# Patient Record
Sex: FEMALE | Race: WHITE | Employment: FULL TIME | URBAN - METROPOLITAN AREA
[De-identification: names, ages, dates, MRNs, and addresses within clinical notes are randomized per-mention and may not be internally consistent; named-entity substitution may affect disease eponyms.]

---

## 2023-03-27 ENCOUNTER — HOSPITAL ENCOUNTER (EMERGENCY)
Age: 38
Discharge: HOME | End: 2023-03-27
Attending: EMERGENCY MEDICINE
Payer: COMMERCIAL

## 2023-03-27 ENCOUNTER — APPOINTMENT (OUTPATIENT)
Dept: GENERAL RADIOLOGY | Age: 38
End: 2023-03-27
Payer: COMMERCIAL

## 2023-03-27 VITALS
TEMPERATURE: 97.2 F | SYSTOLIC BLOOD PRESSURE: 112 MMHG | HEIGHT: 65 IN | DIASTOLIC BLOOD PRESSURE: 87 MMHG | RESPIRATION RATE: 16 BRPM | HEART RATE: 72 BPM | BODY MASS INDEX: 29.16 KG/M2 | OXYGEN SATURATION: 100 % | WEIGHT: 175 LBS

## 2023-03-27 DIAGNOSIS — S61.215A LACERATION OF LEFT RING FINGER WITHOUT FOREIGN BODY, NAIL DAMAGE STATUS UNSPECIFIED, INITIAL ENCOUNTER: Primary | ICD-10-CM
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
## 2023-03-27 PROCEDURE — 6370000000 HC RX 637 (ALT 250 FOR IP): Performed by: NURSE PRACTITIONER

## 2023-03-27 PROCEDURE — 99284 EMERGENCY DEPT VISIT MOD MDM: CPT

## 2023-03-27 PROCEDURE — 10120 INC&RMVL FB SUBQ TISS SMPL: CPT

## 2023-03-27 PROCEDURE — 90714 TD VACC NO PRESV 7 YRS+ IM: CPT | Performed by: NURSE PRACTITIONER

## 2023-03-27 PROCEDURE — 90471 IMMUNIZATION ADMIN: CPT | Performed by: NURSE PRACTITIONER

## 2023-03-27 PROCEDURE — 73130 X-RAY EXAM OF HAND: CPT

## 2023-03-27 PROCEDURE — 6360000002 HC RX W HCPCS: Performed by: NURSE PRACTITIONER

## 2023-03-27 RX ORDER — TETANUS AND DIPHTHERIA TOXOIDS ADSORBED 2; 2 [LF]/.5ML; [LF]/.5ML
0.5 INJECTION INTRAMUSCULAR ONCE
Status: COMPLETED | OUTPATIENT
Start: 2023-03-27 | End: 2023-03-27

## 2023-03-27 RX ORDER — GINSENG 100 MG
CAPSULE ORAL ONCE
Status: COMPLETED | OUTPATIENT
Start: 2023-03-27 | End: 2023-03-27

## 2023-03-27 RX ORDER — IBUPROFEN 800 MG/1
800 TABLET ORAL EVERY 6 HOURS PRN
Qty: 21 TABLET | Refills: 0 | Status: SHIPPED | OUTPATIENT
Start: 2023-03-27

## 2023-03-27 RX ADMIN — BACITRACIN: 500 OINTMENT TOPICAL at 09:38

## 2023-03-27 RX ADMIN — TETANUS AND DIPHTHERIA TOXOIDS ADSORBED 0.5 ML: 2; 2 INJECTION INTRAMUSCULAR at 09:38

## 2023-03-27 ASSESSMENT — PAIN - FUNCTIONAL ASSESSMENT: PAIN_FUNCTIONAL_ASSESSMENT: NONE - DENIES PAIN

## 2023-03-27 NOTE — ED PROVIDER NOTES
Questions are answered at this time and they are agreeable with the plan.      --------------------------------- IMPRESSION AND DISPOSITION ---------------------------------    IMPRESSION  1. Laceration of left ring finger without foreign body, nail damage status unspecified, initial encounter    2.  Ring or other jewelry causing external constriction, sequela        DISPOSITION  Disposition: Discharge to home  Patient condition is good                  [facility], APRN - CNP  03/27/23 [id number]

## 2023-03-27 NOTE — Clinical Note
Marni Rodriguez was seen and treated in our emergency department on 3/27/2023. She may return to work on 03/28/2023. If you have any questions or concerns, please don't hesitate to call.       Yamilet Panchal, APRN - CNP no drainage/no fever/no purulent drainage/no red streaks/no vomiting/no chills

## 2023-09-21 PROBLEM — O99.212 OBESITY AFFECTING PREGNANCY IN SECOND TRIMESTER: Status: ACTIVE | Noted: 2023-09-21

## 2024-01-03 PROBLEM — Q32.4: Status: ACTIVE | Noted: 2024-01-03

## 2024-01-20 ENCOUNTER — HOSPITAL ENCOUNTER (INPATIENT)
Age: 39
LOS: 4 days | Discharge: HOME | End: 2024-01-24
Attending: OBSTETRICS & GYNECOLOGY | Admitting: OBSTETRICS & GYNECOLOGY
Payer: COMMERCIAL

## 2024-01-20 ENCOUNTER — ANESTHESIA EVENT (OUTPATIENT)
Dept: LABOR AND DELIVERY | Age: 39
End: 2024-01-20
Payer: COMMERCIAL

## 2024-01-20 ENCOUNTER — ANESTHESIA (OUTPATIENT)
Dept: LABOR AND DELIVERY | Age: 39
End: 2024-01-20
Payer: COMMERCIAL

## 2024-01-20 DIAGNOSIS — G89.18 ACUTE POSTOPERATIVE PAIN: Primary | ICD-10-CM

## 2024-01-20 PROBLEM — Z3A.37 37 WEEKS GESTATION OF PREGNANCY: Status: ACTIVE | Noted: 2024-01-20

## 2024-01-20 LAB
ABO + RH BLD: NORMAL
AMNISURE, POC: POSITIVE
AMPHET UR QL SCN: NEGATIVE
ARM BAND NUMBER: NORMAL
BARBITURATES UR QL SCN: NEGATIVE
BENZODIAZ UR QL: NEGATIVE
BLOOD BANK SAMPLE EXPIRATION: NORMAL
BLOOD GROUP ANTIBODIES SERPL: NEGATIVE
BUPRENORPHINE UR QL: NEGATIVE
CANNABINOIDS UR QL SCN: NEGATIVE
COCAINE UR QL SCN: NEGATIVE
ERYTHROCYTE [DISTWIDTH] IN BLOOD BY AUTOMATED COUNT: 14.3 % (ref 11.5–15)
FENTANYL UR QL: NEGATIVE
HCT VFR BLD AUTO: 35.1 % (ref 34–48)
HGB BLD-MCNC: 11.2 G/DL (ref 11.5–15.5)
Lab: NORMAL
MCH RBC QN AUTO: 27.7 PG (ref 26–35)
MCHC RBC AUTO-ENTMCNC: 31.9 G/DL (ref 32–34.5)
MCV RBC AUTO: 86.9 FL (ref 80–99.9)
METHADONE UR QL: NEGATIVE
NEGATIVE QC PASS/FAIL: NORMAL
OPIATES UR QL SCN: NEGATIVE
OXYCODONE UR QL SCN: NEGATIVE
PCP UR QL SCN: NEGATIVE
PLATELET # BLD AUTO: 250 K/UL (ref 130–450)
PMV BLD AUTO: 10 FL (ref 7–12)
POSITIVE QC PASS/FAIL: NORMAL
RBC # BLD AUTO: 4.04 M/UL (ref 3.5–5.5)
TEST INFORMATION: NORMAL
WBC OTHER # BLD: 11.2 K/UL (ref 4.5–11.5)

## 2024-01-20 PROCEDURE — 2500000003 HC RX 250 WO HCPCS: Performed by: ANESTHESIOLOGY

## 2024-01-20 PROCEDURE — 51701 INSERT BLADDER CATHETER: CPT

## 2024-01-20 PROCEDURE — 36415 COLL VENOUS BLD VENIPUNCTURE: CPT

## 2024-01-20 PROCEDURE — 86900 BLOOD TYPING SEROLOGIC ABO: CPT

## 2024-01-20 PROCEDURE — 2580000003 HC RX 258: Performed by: OBSTETRICS & GYNECOLOGY

## 2024-01-20 PROCEDURE — 3700000025 EPIDURAL BLOCK: Performed by: ANESTHESIOLOGY

## 2024-01-20 PROCEDURE — 1220000001 HC SEMI PRIVATE L&D R&B

## 2024-01-20 PROCEDURE — 86901 BLOOD TYPING SEROLOGIC RH(D): CPT

## 2024-01-20 PROCEDURE — 80307 DRUG TEST PRSMV CHEM ANLYZR: CPT

## 2024-01-20 PROCEDURE — 85027 COMPLETE CBC AUTOMATED: CPT

## 2024-01-20 PROCEDURE — 86850 RBC ANTIBODY SCREEN: CPT

## 2024-01-20 PROCEDURE — 6360000002 HC RX W HCPCS

## 2024-01-20 RX ORDER — ACETAMINOPHEN 650 MG
TABLET, EXTENDED RELEASE ORAL
Status: DISCONTINUED
Start: 2024-01-20 | End: 2024-01-21

## 2024-01-20 RX ORDER — SODIUM CHLORIDE, SODIUM LACTATE, POTASSIUM CHLORIDE, CALCIUM CHLORIDE 600; 310; 30; 20 MG/100ML; MG/100ML; MG/100ML; MG/100ML
INJECTION, SOLUTION INTRAVENOUS CONTINUOUS
Status: DISCONTINUED | OUTPATIENT
Start: 2024-01-20 | End: 2024-01-21

## 2024-01-20 RX ORDER — ONDANSETRON 2 MG/ML
4 INJECTION INTRAMUSCULAR; INTRAVENOUS EVERY 6 HOURS PRN
Status: DISCONTINUED | OUTPATIENT
Start: 2024-01-20 | End: 2024-01-21

## 2024-01-20 RX ORDER — BUPIVACAINE HYDROCHLORIDE 2.5 MG/ML
INJECTION, SOLUTION EPIDURAL; INFILTRATION; INTRACAUDAL PRN
Status: DISCONTINUED | OUTPATIENT
Start: 2024-01-20 | End: 2024-01-21 | Stop reason: SDUPTHER

## 2024-01-20 RX ORDER — ERYTHROMYCIN 5 MG/G
OINTMENT OPHTHALMIC
Status: DISCONTINUED
Start: 2024-01-20 | End: 2024-01-21

## 2024-01-20 RX ORDER — FENTANYL CITRATE 50 UG/ML
INJECTION, SOLUTION INTRAMUSCULAR; INTRAVENOUS PRN
Status: DISCONTINUED | OUTPATIENT
Start: 2024-01-20 | End: 2024-01-21 | Stop reason: SDUPTHER

## 2024-01-20 RX ORDER — SODIUM CHLORIDE 9 MG/ML
25 INJECTION, SOLUTION INTRAVENOUS PRN
Status: DISCONTINUED | OUTPATIENT
Start: 2024-01-20 | End: 2024-01-21

## 2024-01-20 RX ORDER — SODIUM CHLORIDE, SODIUM LACTATE, POTASSIUM CHLORIDE, AND CALCIUM CHLORIDE .6; .31; .03; .02 G/100ML; G/100ML; G/100ML; G/100ML
500 INJECTION, SOLUTION INTRAVENOUS PRN
Status: DISCONTINUED | OUTPATIENT
Start: 2024-01-20 | End: 2024-01-21

## 2024-01-20 RX ORDER — MISOPROSTOL 200 UG/1
800 TABLET ORAL PRN
Status: DISCONTINUED | OUTPATIENT
Start: 2024-01-20 | End: 2024-01-21

## 2024-01-20 RX ORDER — DOCUSATE SODIUM 100 MG/1
100 CAPSULE, LIQUID FILLED ORAL 2 TIMES DAILY
Status: DISCONTINUED | OUTPATIENT
Start: 2024-01-20 | End: 2024-01-21

## 2024-01-20 RX ORDER — CARBOPROST TROMETHAMINE 250 UG/ML
250 INJECTION, SOLUTION INTRAMUSCULAR PRN
Status: DISCONTINUED | OUTPATIENT
Start: 2024-01-20 | End: 2024-01-21

## 2024-01-20 RX ORDER — SODIUM CHLORIDE 0.9 % (FLUSH) 0.9 %
5-40 SYRINGE (ML) INJECTION EVERY 12 HOURS SCHEDULED
Status: DISCONTINUED | OUTPATIENT
Start: 2024-01-20 | End: 2024-01-21

## 2024-01-20 RX ORDER — LIDOCAINE HYDROCHLORIDE 10 MG/ML
INJECTION, SOLUTION INFILTRATION; PERINEURAL
Status: DISCONTINUED
Start: 2024-01-20 | End: 2024-01-21

## 2024-01-20 RX ORDER — NALOXONE HYDROCHLORIDE 0.4 MG/ML
INJECTION, SOLUTION INTRAMUSCULAR; INTRAVENOUS; SUBCUTANEOUS PRN
Status: DISCONTINUED | OUTPATIENT
Start: 2024-01-20 | End: 2024-01-21 | Stop reason: HOSPADM

## 2024-01-20 RX ORDER — METHYLERGONOVINE MALEATE 0.2 MG/ML
200 INJECTION INTRAVENOUS PRN
Status: DISCONTINUED | OUTPATIENT
Start: 2024-01-20 | End: 2024-01-21

## 2024-01-20 RX ORDER — PHYTONADIONE 1 MG/.5ML
INJECTION, EMULSION INTRAMUSCULAR; INTRAVENOUS; SUBCUTANEOUS
Status: DISCONTINUED
Start: 2024-01-20 | End: 2024-01-21

## 2024-01-20 RX ORDER — SODIUM CHLORIDE, SODIUM LACTATE, POTASSIUM CHLORIDE, AND CALCIUM CHLORIDE .6; .31; .03; .02 G/100ML; G/100ML; G/100ML; G/100ML
1000 INJECTION, SOLUTION INTRAVENOUS PRN
Status: DISCONTINUED | OUTPATIENT
Start: 2024-01-20 | End: 2024-01-21

## 2024-01-20 RX ORDER — SODIUM CHLORIDE 0.9 % (FLUSH) 0.9 %
5-40 SYRINGE (ML) INJECTION PRN
Status: DISCONTINUED | OUTPATIENT
Start: 2024-01-20 | End: 2024-01-21

## 2024-01-20 RX ADMIN — Medication 1 MILLI-UNITS/MIN: at 12:31

## 2024-01-20 RX ADMIN — SODIUM CHLORIDE, POTASSIUM CHLORIDE, SODIUM LACTATE AND CALCIUM CHLORIDE: 600; 310; 30; 20 INJECTION, SOLUTION INTRAVENOUS at 17:59

## 2024-01-20 RX ADMIN — BUPIVACAINE HYDROCHLORIDE 8 ML: 2.5 INJECTION, SOLUTION EPIDURAL; INFILTRATION; INTRACAUDAL; PERINEURAL at 21:58

## 2024-01-20 RX ADMIN — Medication 15 ML/HR: at 20:25

## 2024-01-20 RX ADMIN — Medication 15 ML/HR: at 14:15

## 2024-01-20 RX ADMIN — FENTANYL CITRATE 100 MCG: 50 INJECTION, SOLUTION INTRAMUSCULAR; INTRAVENOUS at 21:58

## 2024-01-20 ASSESSMENT — PAIN SCALES - GENERAL
PAINLEVEL_OUTOF10: 0
PAINLEVEL_OUTOF10: 0

## 2024-01-20 NOTE — PROGRESS NOTES
Patient presents to L&D with complaints of LOF since 300 today. Feels some mild contractions and fetal movement. Denies vaginal bleeding. IUP at 37.1, . Amnisure positive. GBS negative. Janelle Callaway CNM, in to see patient.

## 2024-01-20 NOTE — ANESTHESIA PROCEDURE NOTES
Epidural Block    Patient location during procedure: OB  Start time: 1/20/2024 2:07 PM  End time: 1/20/2024 2:25 PM  Reason for block: labor epidural  Staffing  Performed: other anesthesia staff   Other anesthesia staff: Elen Cedeño RN  Performed by: Marcos Eduardo APRN - CRNA  Authorized by: Anayeli Lemus DO    Epidural  Patient position: sitting  Prep: Betadine  Patient monitoring: cardiac monitor, continuous pulse ox and frequent blood pressure checks  Approach: midline  Location: L3-4  Injection technique: KHAI saline  Provider prep: mask and sterile gloves  Needle  Needle type: Tuohy   Needle gauge: 18 G  Needle length: 3.5 in  Needle insertion depth: 7 cm  Catheter type: end hole  Catheter size: 20 G.  Catheter at skin depth: 13 cm  Test dose: negativeCatheter Secured: tegaderm and tape  Assessment  Hemodynamics: stable  Attempts: 1  Outcomes: uncomplicated and patient tolerated procedure well  Additional Notes  Pt states epidural not working at all, epidural replaced a level lower without difficulty.  Old epidural catheter tip intact  Preanesthetic Checklist  Completed: patient identified, IV checked, site marked, risks and benefits discussed, surgical/procedural consents, equipment checked, pre-op evaluation, timeout performed, anesthesia consent given, oxygen available and monitors applied/VS acknowledged

## 2024-01-20 NOTE — PROGRESS NOTES
Assumed care of pt. Pt anxious. Admitted to room 7. Pt denies pain. Notes she is leaking clear fluid.

## 2024-01-20 NOTE — PROGRESS NOTES
Talked with rosemary hernández, pt 2 -80% with 2nd epidural, and is comfortable . Pt uc's q 1-2 pit from 10 back to 8

## 2024-01-20 NOTE — H&P
Department of Obstetrics and Gynecology  Labor and Delivery  History & Physical    Patient:  Shawna Lynn     Admit Date:  2024 10:35 AM  Medical Record Number:  [id number]    CHIEF COMPLAINT:  Leaking of fluid     PROBLEM LIST:     Patient Active Problem List   Diagnosis    Abnormal radiographic examination    Asthma exacerbation, mild    Congenital emphysema    Atypical mole    Obesity affecting pregnancy in second trimester    Bronchial atresia           HISTORY OF PRESENT ILLNESS:    The patient is a 38 y.o.  female  at 37w1d.   Patient presents with a chief complaint of leaking of fluid. This started in the middle of the night. Pt continued leaking clear fluid throughout the monrning. She has + fetal movement. Denies contractions or VB.     ESTIMATED DUE DATE: Estimated Date of Delivery: 24    PRENATAL CARE:  Complicated by: AMA    GBS: negative    Past OB History  OB History          1    Para   0    Term   0       0    AB   0    Living   0         SAB   0    IAB   0    Ectopic   0    Molar        Multiple   0    Live Births                    Past Medical History:        Diagnosis Date    Abnormal PFTs (pulmonary function tests)     Allergic rhinitis     Anxiety     Bronchial atresia     Bronchial atresia with segmental pulmonary emphysema     congenital emphysema    Congenital emphysema        Past Surgical History:        Procedure Laterality Date    WISDOM TOOTH EXTRACTION         Allergies:  Patient has no known allergies.    Social History:    Social History     Socioeconomic History    Marital status:      Spouse name: Not on file    Number of children: Not on file    Years of education: Not on file    Highest education level: Not on file   Occupational History    Not on file   Tobacco Use    Smoking status: Never    Smokeless tobacco: Never   Substance and Sexual Activity    Alcohol use: Yes     Alcohol/week: 0.0 standard drinks of alcohol     Comment:

## 2024-01-20 NOTE — ANESTHESIA PRE PROCEDURE
Department of Anesthesiology  Preprocedure Note       Name:  Shawna Lynn   Age:  38 y.o.  :  1985                                          MRN:  [id number]         Date:  2024      Surgeon: Dr. Goel    Procedure: labor analgesia    Medications prior to admission:   Prior to Admission medications    Medication Sig Start Date End Date Taking? Authorizing Provider   hydrocortisone (ANUSOL-HC) 2.5 % CREA rectal cream Apply rectally daily for 1-2 weeks 23   Margie Goel MD   Prenatal MV-Min-Fe Fum-FA-DHA (PRENATAL 1 PO) Take by mouth    Provider, MD Marcelino   escitalopram (LEXAPRO) 10 MG tablet TAKE ONE TABLET BY MOUTH DAILY 20   Rowdy West MD       Current medications:    Current Facility-Administered Medications   Medication Dose Route Frequency Provider Last Rate Last Admin    lactated ringers IV soln infusion   IntraVENous Continuous Margie Goel MD        lactated ringers bolus bolus 500 mL  500 mL IntraVENous PRN Margie Goel MD        Or    lactated ringers bolus bolus 1,000 mL  1,000 mL IntraVENous PRN Margie Goel MD        sodium chloride flush 0.9 % injection 5-40 mL  5-40 mL IntraVENous 2 times per day Margie Goel MD        sodium chloride flush 0.9 % injection 5-40 mL  5-40 mL IntraVENous PRN Margie Goel MD        0.9 % sodium chloride infusion  25 mL IntraVENous PRN Margie Goel MD        methylergonovine (METHERGINE) injection 200 mcg  200 mcg IntraMUSCular PRN Margie Goel MD        carboprost (HEMABATE) injection 250 mcg  250 mcg IntraMUSCular PRN Margie Goel MD        miSOPROStol (CYTOTEC) tablet 800 mcg  800 mcg Rectal PRN Margie Goel MD        tranexamic acid (CYKLOKAPRON) 1,000 mg in sodium chloride 0.9 % 100 mL IVPB  1,000 mg IntraVENous Once PRN Margie Goel MD        oxytocin (PITOCIN) 15 units in 250 mL infusion  87.3 leon-units/min IntraVENous Continuous PRN Margie Goel MD        And

## 2024-01-21 PROCEDURE — 6370000000 HC RX 637 (ALT 250 FOR IP): Performed by: ANESTHESIOLOGY

## 2024-01-21 PROCEDURE — 6360000002 HC RX W HCPCS: Performed by: ANESTHESIOLOGY

## 2024-01-21 PROCEDURE — 6360000002 HC RX W HCPCS

## 2024-01-21 PROCEDURE — 3700000000 HC ANESTHESIA ATTENDED CARE: Performed by: OBSTETRICS & GYNECOLOGY

## 2024-01-21 PROCEDURE — 6360000002 HC RX W HCPCS: Performed by: NURSE ANESTHETIST, CERTIFIED REGISTERED

## 2024-01-21 PROCEDURE — 2580000003 HC RX 258: Performed by: OBSTETRICS & GYNECOLOGY

## 2024-01-21 PROCEDURE — 51701 INSERT BLADDER CATHETER: CPT

## 2024-01-21 PROCEDURE — 2580000003 HC RX 258

## 2024-01-21 PROCEDURE — 1220000000 HC SEMI PRIVATE OB R&B

## 2024-01-21 PROCEDURE — 6360000002 HC RX W HCPCS: Performed by: OBSTETRICS & GYNECOLOGY

## 2024-01-21 PROCEDURE — 2500000003 HC RX 250 WO HCPCS: Performed by: ANESTHESIOLOGY

## 2024-01-21 PROCEDURE — 2709999900 HC NON-CHARGEABLE SUPPLY: Performed by: OBSTETRICS & GYNECOLOGY

## 2024-01-21 PROCEDURE — 3609079900 HC CESAREAN SECTION: Performed by: OBSTETRICS & GYNECOLOGY

## 2024-01-21 PROCEDURE — 6370000000 HC RX 637 (ALT 250 FOR IP)

## 2024-01-21 PROCEDURE — 3700000001 HC ADD 15 MINUTES (ANESTHESIA): Performed by: OBSTETRICS & GYNECOLOGY

## 2024-01-21 PROCEDURE — 7100000001 HC PACU RECOVERY - ADDTL 15 MIN: Performed by: OBSTETRICS & GYNECOLOGY

## 2024-01-21 PROCEDURE — 6370000000 HC RX 637 (ALT 250 FOR IP): Performed by: OBSTETRICS & GYNECOLOGY

## 2024-01-21 PROCEDURE — 7100000000 HC PACU RECOVERY - FIRST 15 MIN: Performed by: OBSTETRICS & GYNECOLOGY

## 2024-01-21 PROCEDURE — 59514 CESAREAN DELIVERY ONLY: CPT | Performed by: OBSTETRICS & GYNECOLOGY

## 2024-01-21 RX ORDER — WATER 10 ML/10ML
INJECTION INTRAMUSCULAR; INTRAVENOUS; SUBCUTANEOUS
Status: DISCONTINUED
Start: 2024-01-21 | End: 2024-01-21

## 2024-01-21 RX ORDER — ONDANSETRON 2 MG/ML
INJECTION INTRAMUSCULAR; INTRAVENOUS PRN
Status: DISCONTINUED | OUTPATIENT
Start: 2024-01-21 | End: 2024-01-21 | Stop reason: SDUPTHER

## 2024-01-21 RX ORDER — IBUPROFEN 800 MG/1
800 TABLET ORAL EVERY 8 HOURS SCHEDULED
Status: DISCONTINUED | OUTPATIENT
Start: 2024-01-21 | End: 2024-01-24 | Stop reason: HOSPADM

## 2024-01-21 RX ORDER — SODIUM CHLORIDE, SODIUM LACTATE, POTASSIUM CHLORIDE, AND CALCIUM CHLORIDE .6; .31; .03; .02 G/100ML; G/100ML; G/100ML; G/100ML
1000 INJECTION, SOLUTION INTRAVENOUS ONCE
Status: DISCONTINUED | OUTPATIENT
Start: 2024-01-21 | End: 2024-01-24 | Stop reason: HOSPADM

## 2024-01-21 RX ORDER — PENICILLIN G 3000000 [IU]/50ML
3 INJECTION, SOLUTION INTRAVENOUS EVERY 4 HOURS
Status: DISCONTINUED | OUTPATIENT
Start: 2024-01-21 | End: 2024-01-21

## 2024-01-21 RX ORDER — ESCITALOPRAM OXALATE 10 MG/1
10 TABLET ORAL DAILY
Status: DISCONTINUED | OUTPATIENT
Start: 2024-01-21 | End: 2024-01-24 | Stop reason: HOSPADM

## 2024-01-21 RX ORDER — SODIUM CHLORIDE 9 MG/ML
INJECTION, SOLUTION INTRAVENOUS PRN
Status: DISCONTINUED | OUTPATIENT
Start: 2024-01-21 | End: 2024-01-24 | Stop reason: HOSPADM

## 2024-01-21 RX ORDER — VITS A,C,E/LUTEIN/MINERALS 300MCG-200
1 TABLET ORAL DAILY
Status: DISCONTINUED | OUTPATIENT
Start: 2024-01-21 | End: 2024-01-24 | Stop reason: HOSPADM

## 2024-01-21 RX ORDER — CEFAZOLIN 2 G/1
INJECTION, POWDER, FOR SOLUTION INTRAMUSCULAR; INTRAVENOUS
Status: COMPLETED
Start: 2024-01-21 | End: 2024-01-21

## 2024-01-21 RX ORDER — ACETAMINOPHEN 500 MG
1000 TABLET ORAL EVERY 8 HOURS SCHEDULED
Status: DISCONTINUED | OUTPATIENT
Start: 2024-01-21 | End: 2024-01-24 | Stop reason: HOSPADM

## 2024-01-21 RX ORDER — MORPHINE SULFATE 1 MG/ML
INJECTION, SOLUTION EPIDURAL; INTRATHECAL; INTRAVENOUS PRN
Status: DISCONTINUED | OUTPATIENT
Start: 2024-01-21 | End: 2024-01-21 | Stop reason: SDUPTHER

## 2024-01-21 RX ORDER — CITRIC ACID/SODIUM CITRATE 334-500MG
30 SOLUTION, ORAL ORAL ONCE
Status: DISCONTINUED | OUTPATIENT
Start: 2024-01-21 | End: 2024-01-24 | Stop reason: HOSPADM

## 2024-01-21 RX ORDER — DIPHENHYDRAMINE HCL 25 MG
25 TABLET ORAL EVERY 6 HOURS PRN
Status: ACTIVE | OUTPATIENT
Start: 2024-01-21 | End: 2024-01-22

## 2024-01-21 RX ORDER — OXYCODONE HYDROCHLORIDE 5 MG/1
10 TABLET ORAL EVERY 4 HOURS PRN
Status: DISPENSED | OUTPATIENT
Start: 2024-01-21 | End: 2024-01-22

## 2024-01-21 RX ORDER — DOCUSATE SODIUM 100 MG/1
100 CAPSULE, LIQUID FILLED ORAL 2 TIMES DAILY
Status: DISCONTINUED | OUTPATIENT
Start: 2024-01-21 | End: 2024-01-24 | Stop reason: HOSPADM

## 2024-01-21 RX ORDER — PHENYLEPHRINE HCL IN 0.9% NACL 1 MG/10 ML
SYRINGE (ML) INTRAVENOUS PRN
Status: DISCONTINUED | OUTPATIENT
Start: 2024-01-21 | End: 2024-01-21 | Stop reason: SDUPTHER

## 2024-01-21 RX ORDER — FERROUS SULFATE 325(65) MG
325 TABLET ORAL EVERY OTHER DAY
Status: DISCONTINUED | OUTPATIENT
Start: 2024-01-21 | End: 2024-01-24 | Stop reason: HOSPADM

## 2024-01-21 RX ORDER — SODIUM CHLORIDE 0.9 % (FLUSH) 0.9 %
10 SYRINGE (ML) INJECTION PRN
Status: DISCONTINUED | OUTPATIENT
Start: 2024-01-21 | End: 2024-01-24 | Stop reason: HOSPADM

## 2024-01-21 RX ORDER — OXYCODONE HYDROCHLORIDE 5 MG/1
5 TABLET ORAL EVERY 4 HOURS PRN
Status: DISCONTINUED | OUTPATIENT
Start: 2024-01-22 | End: 2024-01-24 | Stop reason: HOSPADM

## 2024-01-21 RX ORDER — ONDANSETRON 2 MG/ML
4 INJECTION INTRAMUSCULAR; INTRAVENOUS EVERY 6 HOURS PRN
Status: ACTIVE | OUTPATIENT
Start: 2024-01-21 | End: 2024-01-22

## 2024-01-21 RX ORDER — NALOXONE HYDROCHLORIDE 0.4 MG/ML
INJECTION, SOLUTION INTRAMUSCULAR; INTRAVENOUS; SUBCUTANEOUS PRN
Status: ACTIVE | OUTPATIENT
Start: 2024-01-21 | End: 2024-01-22

## 2024-01-21 RX ORDER — BUPIVACAINE HYDROCHLORIDE 7.5 MG/ML
INJECTION, SOLUTION INTRASPINAL PRN
Status: DISCONTINUED | OUTPATIENT
Start: 2024-01-21 | End: 2024-01-21 | Stop reason: SDUPTHER

## 2024-01-21 RX ORDER — ONDANSETRON 2 MG/ML
4 INJECTION INTRAMUSCULAR; INTRAVENOUS EVERY 6 HOURS PRN
Status: DISCONTINUED | OUTPATIENT
Start: 2024-01-21 | End: 2024-01-24 | Stop reason: HOSPADM

## 2024-01-21 RX ORDER — CITRIC ACID/SODIUM CITRATE 334-500MG
SOLUTION, ORAL ORAL
Status: DISCONTINUED
Start: 2024-01-21 | End: 2024-01-21

## 2024-01-21 RX ORDER — DIPHENHYDRAMINE HYDROCHLORIDE 50 MG/ML
25 INJECTION INTRAMUSCULAR; INTRAVENOUS EVERY 6 HOURS PRN
Status: ACTIVE | OUTPATIENT
Start: 2024-01-21 | End: 2024-01-22

## 2024-01-21 RX ORDER — ESCITALOPRAM OXALATE 10 MG/1
10 TABLET ORAL DAILY
Status: DISCONTINUED | OUTPATIENT
Start: 2024-01-21 | End: 2024-01-21

## 2024-01-21 RX ORDER — KETOROLAC TROMETHAMINE 30 MG/ML
30 INJECTION, SOLUTION INTRAMUSCULAR; INTRAVENOUS EVERY 6 HOURS PRN
Status: DISPENSED | OUTPATIENT
Start: 2024-01-21 | End: 2024-01-22

## 2024-01-21 RX ORDER — OXYCODONE HYDROCHLORIDE 5 MG/1
5 TABLET ORAL EVERY 4 HOURS PRN
Status: DISPENSED | OUTPATIENT
Start: 2024-01-21 | End: 2024-01-22

## 2024-01-21 RX ORDER — SIMETHICONE 80 MG
80 TABLET,CHEWABLE ORAL 4 TIMES DAILY
Status: DISCONTINUED | OUTPATIENT
Start: 2024-01-21 | End: 2024-01-24 | Stop reason: HOSPADM

## 2024-01-21 RX ORDER — PRENATAL WITH FERROUS FUM AND FOLIC ACID 3080; 920; 120; 400; 22; 1.84; 3; 20; 10; 1; 12; 200; 27; 25; 2 [IU]/1; [IU]/1; MG/1; [IU]/1; MG/1; MG/1; MG/1; MG/1; MG/1; MG/1; UG/1; MG/1; MG/1; MG/1; MG/1
1 TABLET ORAL DAILY
Status: DISCONTINUED | OUTPATIENT
Start: 2024-01-21 | End: 2024-01-21 | Stop reason: CLARIF

## 2024-01-21 RX ORDER — SODIUM CHLORIDE, SODIUM LACTATE, POTASSIUM CHLORIDE, CALCIUM CHLORIDE 600; 310; 30; 20 MG/100ML; MG/100ML; MG/100ML; MG/100ML
INJECTION, SOLUTION INTRAVENOUS CONTINUOUS
Status: DISCONTINUED | OUTPATIENT
Start: 2024-01-21 | End: 2024-01-24 | Stop reason: HOSPADM

## 2024-01-21 RX ORDER — ONDANSETRON 4 MG/1
4 TABLET, ORALLY DISINTEGRATING ORAL EVERY 8 HOURS PRN
Status: DISCONTINUED | OUTPATIENT
Start: 2024-01-21 | End: 2024-01-24 | Stop reason: HOSPADM

## 2024-01-21 RX ORDER — BISACODYL 10 MG
10 SUPPOSITORY, RECTAL RECTAL PRN
Status: DISCONTINUED | OUTPATIENT
Start: 2024-01-21 | End: 2024-01-24 | Stop reason: HOSPADM

## 2024-01-21 RX ORDER — SODIUM CHLORIDE 0.9 % (FLUSH) 0.9 %
5-40 SYRINGE (ML) INJECTION EVERY 12 HOURS SCHEDULED
Status: DISCONTINUED | OUTPATIENT
Start: 2024-01-21 | End: 2024-01-24 | Stop reason: HOSPADM

## 2024-01-21 RX ORDER — SODIUM CHLORIDE 0.9 % (FLUSH) 0.9 %
5-40 SYRINGE (ML) INJECTION PRN
Status: DISCONTINUED | OUTPATIENT
Start: 2024-01-21 | End: 2024-01-24 | Stop reason: HOSPADM

## 2024-01-21 RX ORDER — OXYCODONE HYDROCHLORIDE 5 MG/1
10 TABLET ORAL EVERY 4 HOURS PRN
Status: DISCONTINUED | OUTPATIENT
Start: 2024-01-22 | End: 2024-01-24 | Stop reason: HOSPADM

## 2024-01-21 RX ORDER — MODIFIED LANOLIN
OINTMENT (GRAM) TOPICAL
Status: DISCONTINUED | OUTPATIENT
Start: 2024-01-21 | End: 2024-01-24 | Stop reason: HOSPADM

## 2024-01-21 RX ADMIN — KETOROLAC TROMETHAMINE 30 MG: 30 INJECTION, SOLUTION INTRAMUSCULAR; INTRAVENOUS at 13:59

## 2024-01-21 RX ADMIN — Medication 1 MILLI-UNITS/MIN: at 05:25

## 2024-01-21 RX ADMIN — Medication 150 MCG: at 11:58

## 2024-01-21 RX ADMIN — SODIUM CHLORIDE, POTASSIUM CHLORIDE, SODIUM LACTATE AND CALCIUM CHLORIDE 500 ML: 600; 310; 30; 20 INJECTION, SOLUTION INTRAVENOUS at 00:51

## 2024-01-21 RX ADMIN — Medication 150 MCG: at 12:03

## 2024-01-21 RX ADMIN — PENICILLIN G 3 MILLION UNITS: 3000000 INJECTION, SOLUTION INTRAVENOUS at 09:22

## 2024-01-21 RX ADMIN — Medication 100 MCG: at 12:12

## 2024-01-21 RX ADMIN — MORPHINE SULFATE 0.15 MG: 1 INJECTION, SOLUTION EPIDURAL; INTRATHECAL; INTRAVENOUS at 11:45

## 2024-01-21 RX ADMIN — DEXTROSE MONOHYDRATE 5 MILLION UNITS: 50 INJECTION, SOLUTION INTRAVENOUS at 04:52

## 2024-01-21 RX ADMIN — OXYCODONE 5 MG: 5 TABLET ORAL at 18:08

## 2024-01-21 RX ADMIN — ESCITALOPRAM OXALATE 10 MG: 10 TABLET ORAL at 06:41

## 2024-01-21 RX ADMIN — BUPIVACAINE HYDROCHLORIDE 5 ML: 2.5 INJECTION, SOLUTION EPIDURAL; INFILTRATION; INTRACAUDAL; PERINEURAL at 03:07

## 2024-01-21 RX ADMIN — KETOROLAC TROMETHAMINE 30 MG: 30 INJECTION, SOLUTION INTRAMUSCULAR; INTRAVENOUS at 20:53

## 2024-01-21 RX ADMIN — ONDANSETRON 4 MG: 2 INJECTION INTRAMUSCULAR; INTRAVENOUS at 11:59

## 2024-01-21 RX ADMIN — CEFAZOLIN 2000 MG: 2 INJECTION, POWDER, FOR SOLUTION INTRAMUSCULAR; INTRAVENOUS at 11:05

## 2024-01-21 RX ADMIN — Medication 15 ML/HR: at 04:42

## 2024-01-21 RX ADMIN — Medication 909 ML/HR: at 11:54

## 2024-01-21 RX ADMIN — Medication 150 MCG: at 11:50

## 2024-01-21 RX ADMIN — SODIUM CHLORIDE, POTASSIUM CHLORIDE, SODIUM LACTATE AND CALCIUM CHLORIDE: 600; 310; 30; 20 INJECTION, SOLUTION INTRAVENOUS at 11:55

## 2024-01-21 RX ADMIN — Medication 100 MCG: at 12:09

## 2024-01-21 RX ADMIN — SODIUM CHLORIDE, PRESERVATIVE FREE 10 ML: 5 INJECTION INTRAVENOUS at 20:53

## 2024-01-21 RX ADMIN — DOCUSATE SODIUM 100 MG: 100 CAPSULE, LIQUID FILLED ORAL at 20:52

## 2024-01-21 RX ADMIN — METHYLERGONOVINE MALEATE 200 MCG: 0.2 INJECTION, SOLUTION INTRAMUSCULAR; INTRAVENOUS at 11:57

## 2024-01-21 RX ADMIN — BUPIVACAINE HYDROCHLORIDE 1.6 ML: 7.5 INJECTION, SOLUTION SUBARACHNOID at 11:45

## 2024-01-21 RX ADMIN — BUPIVACAINE HYDROCHLORIDE 10 ML: 2.5 INJECTION, SOLUTION EPIDURAL; INFILTRATION; INTRACAUDAL; PERINEURAL at 01:04

## 2024-01-21 RX ADMIN — FENTANYL CITRATE 100 MCG: 50 INJECTION, SOLUTION INTRAMUSCULAR; INTRAVENOUS at 01:04

## 2024-01-21 ASSESSMENT — PAIN SCALES - GENERAL
PAINLEVEL_OUTOF10: 5
PAINLEVEL_OUTOF10: 4
PAINLEVEL_OUTOF10: 1

## 2024-01-21 ASSESSMENT — PAIN DESCRIPTION - PAIN TYPE: TYPE: SURGICAL PAIN

## 2024-01-21 ASSESSMENT — PAIN DESCRIPTION - DESCRIPTORS
DESCRIPTORS: CRAMPING
DESCRIPTORS: CRAMPING;DISCOMFORT

## 2024-01-21 ASSESSMENT — PAIN DESCRIPTION - FREQUENCY: FREQUENCY: INTERMITTENT

## 2024-01-21 ASSESSMENT — PAIN DESCRIPTION - ORIENTATION: ORIENTATION: LOWER;MID

## 2024-01-21 ASSESSMENT — PAIN DESCRIPTION - ONSET: ONSET: GRADUAL

## 2024-01-21 ASSESSMENT — PAIN DESCRIPTION - LOCATION: LOCATION: ABDOMEN;INCISION

## 2024-01-21 ASSESSMENT — PAIN - FUNCTIONAL ASSESSMENT: PAIN_FUNCTIONAL_ASSESSMENT: ACTIVITIES ARE NOT PREVENTED

## 2024-01-21 NOTE — PROGRESS NOTES
Called to room for c/o pain.  Bilateral sympathectomy intact by temperature test.  Ten cc bolus via the pump given.

## 2024-01-21 NOTE — PROGRESS NOTES
Notified Dr. Goel of patient unchanged SVE, ctx pattern, fhr tracing and pitocin. Orders obtained to encourage position changes and recheck in 2 hours and call with update.

## 2024-01-21 NOTE — PROGRESS NOTES
Pt requesting her daily dose of Lexapro 10mg that she takes at 0545. New orders received per COTY Callaway CNM.

## 2024-01-21 NOTE — PROGRESS NOTES
Spoke via telephone w/ N. Nilton CHAPARRO. Updated on SVE 5/90/-1. Feel swelling anterior and some caput. Pt CTX q 2-3. Pitocin maxed out since 2330. Ruptured now for 24HR, pt afebrile, FHT baseline 130, moderate variability w/ accels. Pt still uncomfortable with back pain. Anaesthesia notified, coming to bolus.     New orders received to have house officer to place IUPC. Once IUPC placed if pt is having adequate CTX leave pitocin, If not adequate CTX call with update.

## 2024-01-21 NOTE — OP NOTE
First Assist Brief Operative Note    PreOp DX:  37w0d Pregnancy;                        Arrest of dilataion     Post OP Dx:  37w0d Pregnancy delivered by   Living female baby delivered  asynclitic   OP    Procedure:primary low transverse  Surgery/ First Assistance       Anesthesia: Spinal       Summary: The patient was identified and a Time Out Performed. All were in agreement.    Under Spinal Anesthesia the abdomen was prepared and draped using fire prevention and safety protocols.     With my technical assistance Dr. Goel performed  an uncomplicated  Section, opening the abdomen, incising the uterus and delivering a living female baby at , weighing 7 lbs8 oz/3400gms with Apgar: 8/9.       The uterus and the abdomen were closed per routine.     Procedure was well tolerated.    I first assisted with opening the patient, tissue retraction, delivery of the baby, manipulation of suture, and closing of the patient. I was present for the entire case.    Refer to the Surgeons's Operative Report for details.    Mireya Ribera DO, FACOG  2024 12:21 PM

## 2024-01-21 NOTE — PROGRESS NOTES
Janelle JUAREZ CNM called in for update: cx was 4 at 1900, now 4-5/90/-1 as of midnight. Pit was at 8 at 1900, ctxs q2 minutes, after 2100 recheck cx unchanged and have been upping pit since. Pit at 20 since 2330. Patient still not comfortable with epidural, having back and lower abdominal pains. Anesthesia put in 2 separate epidural and has given bolus already. Anesthesia to reevaluate patient after putting in another epidural at this time. Orders to continue to monitor. Will update after next VE.

## 2024-01-21 NOTE — PROGRESS NOTES
SHANKAR Pinto, updated on VE 8, ctxs stopped with pit break, pit now at 4, patient currently comfortable. Afebrile. Giving lexapro now. No new orders.

## 2024-01-21 NOTE — PROGRESS NOTES
COTY Callaway CNNISHI updated, IUPC placed SVE 6-7/90/-1, swollen anteriorly with caput. , moderate variability with accels. Pt cornelio every 2-3.5 minutes, MVU's calculated at 125. New orders received to shut pitocin off for 30 minutes and restart at 1. Pt is still afebrile but feels warm to the touch. New orders received for PCN 5 mil to 3 mil units Q4 hr for prolonged rupture of membranes.

## 2024-01-21 NOTE — OP NOTE
PREOPERATIVE DIAGNOSES:     37 and 2 week intrauterine pregnancy.  2.  arrest of descent, suspected CPD  3. Asynclitic/persistent OP position      POSTOPERATIVE DIAGNOSES:     Same.      PROCEDURE: primary low transverse  section        SURGEON: Margie Goel MD       ASSISTANT:  Dr.Hanigosky ANDREWS  In the absence of a resident they provided assistance with retraction, exposure, delivery of the infant and suture cutting/management throughout the entire procedure      ESTIMATED BLOOD LOSS:  700ml        COMPLICATIONS:  None.         ANESTHESIA:   Spinal anesthesia        FINDINGS:   Live Born  Sex:  Female  Fetal Position:  Cephalic, occiput posterior  Apgars:  1 minute: 8; 5 minute: 9  Weight:  7 pounds, 8 ounces 3400 grams  Tubes, uterus, ovaries:  normal          DETAILS OF PROCEDURE:    The patient was taken to the operating room where Spinal anesthesia was administered and found to be adequate. Abdomen was prepped   and draped in the normal sterile fashion. Pastrana catheter had previously been   placed. Pfannenstiel skin incision made with a scalpel, carried down to the fascia with cautery. The fascia was nicked in the midline and extended laterally with   cautery. Muscles were  in the midline. Peritoneum was grasped with   hemostats, tented up, and entered sharply. Peritoneal incision was extended   superiorly and inferiorly with good visualization of bladder.  Delee bladder blade was introduced. Bladder flap was created with sharp dissection. Low transverse uterine incision was made with a scalpel and extended bluntly. Membranes ruptured for Clear fluid. A viable female infant was delivered in the cephalic presentation without diffiuclty.  Baby was bulb suctioned on the abdomen. Cord was clamped and cut, and handed to waiting R.N. Cord gases and blood were obtained. Placenta was manually extracted with 3 vessel cord. Uterus was exteriorized, cleared of all clot and debris. Uterine incision

## 2024-01-21 NOTE — PROGRESS NOTES
Mother and baby brought over via stretcher in stable condition.  Baby placed in basinette and Mother was slid from stretcher to bed by nurses.  IV infusing per order.  Alert and oriented x 3 and denies pain.  Bedside booklet and papers reviewed with pt-verbalized understanding and gave verbal permission for hep B and bath for baby.  Oriented to room and unit and unit policies including safe sleep, infant safety- (placing baby in basinette if falling asleep with baby in bed.)

## 2024-01-21 NOTE — PROGRESS NOTES
Arrest of dilation  Pt not comfortable with epidural  Need for proceeding with c section d/w pt and partner.  Will proceed with primary LTCS.

## 2024-01-21 NOTE — PROGRESS NOTES
Called olivia ,update pt 4cm bloody show. Pit 8mu pt comfortable temp at 1645 99.3 now 98.6 will be 18 hours srom at 2100. No new orders

## 2024-01-21 NOTE — ANESTHESIA PROCEDURE NOTES
Spinal Block    Patient location during procedure: OR  End time: 1/21/2024 11:45 AM  Reason for block: primary anesthetic  Staffing  Performed: resident/CRNA and other anesthesia staff   Anesthesiologist: Magdalene Moreno DO  Resident/CRNA: Kannan Hernandez APRN - CRNA  Other anesthesia staff: Paulie Rivera RN  Performed by: Paulie Rivera RN  Authorized by: Magdalene Moreno DO    Spinal Block  Patient position: sitting  Prep: ChloraPrep  Patient monitoring: continuous pulse ox and frequent blood pressure checks  Approach: midline  Location: L3/L4  Provider prep: mask and sterile gloves  Local infiltration: lidocaine  Needle  Needle type: Quincke   Needle gauge: 25 G  Needle length: 5 in  Assessment  Swirl obtained: Yes  CSF: clear  Attempts: 3+  Hemodynamics: stable  Preanesthetic Checklist  Completed: patient identified, IV checked, site marked, risks and benefits discussed, surgical/procedural consents, equipment checked, pre-op evaluation, timeout performed, anesthesia consent given, oxygen available and monitors applied/VS acknowledged

## 2024-01-22 LAB
ERYTHROCYTE [DISTWIDTH] IN BLOOD BY AUTOMATED COUNT: 14.9 % (ref 11.5–15)
HCT VFR BLD AUTO: 30.4 % (ref 34–48)
HGB BLD-MCNC: 9.5 G/DL (ref 11.5–15.5)
MCH RBC QN AUTO: 28.2 PG (ref 26–35)
MCHC RBC AUTO-ENTMCNC: 31.3 G/DL (ref 32–34.5)
MCV RBC AUTO: 90.2 FL (ref 80–99.9)
PLATELET # BLD AUTO: 218 K/UL (ref 130–450)
PMV BLD AUTO: 10.6 FL (ref 7–12)
RBC # BLD AUTO: 3.37 M/UL (ref 3.5–5.5)
WBC OTHER # BLD: 14.3 K/UL (ref 4.5–11.5)

## 2024-01-22 PROCEDURE — 6370000000 HC RX 637 (ALT 250 FOR IP): Performed by: OBSTETRICS & GYNECOLOGY

## 2024-01-22 PROCEDURE — 1220000000 HC SEMI PRIVATE OB R&B

## 2024-01-22 PROCEDURE — 85027 COMPLETE CBC AUTOMATED: CPT

## 2024-01-22 PROCEDURE — 6370000000 HC RX 637 (ALT 250 FOR IP): Performed by: ANESTHESIOLOGY

## 2024-01-22 PROCEDURE — 6360000002 HC RX W HCPCS: Performed by: ANESTHESIOLOGY

## 2024-01-22 PROCEDURE — 2580000003 HC RX 258: Performed by: OBSTETRICS & GYNECOLOGY

## 2024-01-22 RX ADMIN — FERROUS SULFATE TAB 325 MG (65 MG ELEMENTAL FE) 325 MG: 325 (65 FE) TAB at 10:11

## 2024-01-22 RX ADMIN — ESCITALOPRAM OXALATE 10 MG: 10 TABLET ORAL at 05:44

## 2024-01-22 RX ADMIN — KETOROLAC TROMETHAMINE 30 MG: 30 INJECTION, SOLUTION INTRAMUSCULAR; INTRAVENOUS at 10:13

## 2024-01-22 RX ADMIN — OXYCODONE 10 MG: 5 TABLET ORAL at 21:37

## 2024-01-22 RX ADMIN — IBUPROFEN 800 MG: 800 TABLET, FILM COATED ORAL at 16:17

## 2024-01-22 RX ADMIN — SIMETHICONE 80 MG: 80 TABLET, CHEWABLE ORAL at 21:34

## 2024-01-22 RX ADMIN — SIMETHICONE 80 MG: 80 TABLET, CHEWABLE ORAL at 10:11

## 2024-01-22 RX ADMIN — OXYCODONE 10 MG: 5 TABLET ORAL at 12:36

## 2024-01-22 RX ADMIN — SIMETHICONE 80 MG: 80 TABLET, CHEWABLE ORAL at 16:16

## 2024-01-22 RX ADMIN — Medication: at 10:12

## 2024-01-22 RX ADMIN — DOCUSATE SODIUM 100 MG: 100 CAPSULE, LIQUID FILLED ORAL at 21:34

## 2024-01-22 RX ADMIN — SODIUM CHLORIDE, PRESERVATIVE FREE 10 ML: 5 INJECTION INTRAVENOUS at 10:14

## 2024-01-22 RX ADMIN — KETOROLAC TROMETHAMINE 30 MG: 30 INJECTION, SOLUTION INTRAMUSCULAR; INTRAVENOUS at 03:28

## 2024-01-22 RX ADMIN — ACETAMINOPHEN 1000 MG: 500 TABLET ORAL at 16:18

## 2024-01-22 RX ADMIN — DOCUSATE SODIUM 100 MG: 100 CAPSULE, LIQUID FILLED ORAL at 10:11

## 2024-01-22 RX ADMIN — Medication 1 TABLET: at 10:11

## 2024-01-22 ASSESSMENT — PAIN SCALES - GENERAL
PAINLEVEL_OUTOF10: 0
PAINLEVEL_OUTOF10: 7
PAINLEVEL_OUTOF10: 7
PAINLEVEL_OUTOF10: 4
PAINLEVEL_OUTOF10: 3
PAINLEVEL_OUTOF10: 2

## 2024-01-22 ASSESSMENT — PAIN - FUNCTIONAL ASSESSMENT
PAIN_FUNCTIONAL_ASSESSMENT: ACTIVITIES ARE NOT PREVENTED

## 2024-01-22 ASSESSMENT — PAIN DESCRIPTION - FREQUENCY: FREQUENCY: INTERMITTENT

## 2024-01-22 ASSESSMENT — PAIN DESCRIPTION - DESCRIPTORS
DESCRIPTORS: DISCOMFORT
DESCRIPTORS: DISCOMFORT
DESCRIPTORS: DISCOMFORT;BURNING
DESCRIPTORS: DISCOMFORT

## 2024-01-22 ASSESSMENT — PAIN DESCRIPTION - LOCATION
LOCATION: INCISION

## 2024-01-22 ASSESSMENT — PAIN DESCRIPTION - ORIENTATION
ORIENTATION: LOWER

## 2024-01-22 NOTE — ANESTHESIA POSTPROCEDURE EVALUATION
Department of Anesthesiology  Postprocedure Note    Patient: Shawna Lynn  MRN: [id number]  YOB: 1985  Date of evaluation: 2024    Procedure Summary       Date: 24 Room / Location: [address]    Anesthesia Start: 1407 Anesthesia Stop: 24 1236    Procedures:        SECTION (Uterus)      Labor Analgesia Diagnosis:        infant of 37 completed weeks of gestation      ([place] infant of 37 completed weeks of gestation [Z38.2])    Surgeons: Margie Goel MD Responsible Provider: Magdalene Moreno DO    Anesthesia Type: Spinal ASA Status: 3            Anesthesia Type: Spinal    Marquita Phase I: Marquita Score: 9    Marquita Phase II: Marquita Score: 9    Anesthesia Post Evaluation    Patient location during evaluation: bedside  Patient participation: complete - patient participated  Level of consciousness: awake and alert  Pain score: 0  Airway patency: patent  Nausea & Vomiting: no vomiting and no nausea  Cardiovascular status: blood pressure returned to baseline and hemodynamically stable  Respiratory status: acceptable, spontaneous ventilation and room air  Hydration status: stable        No notable events documented.

## 2024-01-22 NOTE — PLAN OF CARE
Problem: Discharge Planning  Goal: Discharge to home or other facility with appropriate resources  Outcome: Progressing     Problem: Pain  Goal: Verbalizes/displays adequate comfort level or baseline comfort level  2024 by Krysten Welch RN  Outcome: Progressing  2024 1526 by Maryan Marcano RN  Outcome: Progressing     Problem: Vaginal Birth or  Section  Goal: Fetal and maternal status remain reassuring during the birth process  Description:  Birth OB-Pregnancy care plan goal which identifies if the fetal and maternal status remain reassuring during the birth process  2024 by Krysten Welch, RN  Outcome: Progressing  2024 1526 by Maryan Marcano RN  Outcome: Progressing     Problem: Safety Pediatric - Fall  Goal: Free from fall injury  2024 by Krysten Welch RN  Outcome: Progressing  2024 1526 by Maryan Marcano, RN  Outcome: Progressing     Problem: ABCDS Injury Assessment  Goal: Absence of physical injury  Outcome: Progressing

## 2024-01-22 NOTE — PROGRESS NOTES
[employer] [facility] screening results were discussed with parent. Questions answered. Brochure given to parent. Advised to monitor developmental milestones and contact physician for any concerns.   Alem June

## 2024-01-22 NOTE — PROGRESS NOTES
Subjective:     Postpartum Day 1:  Delivery    The patient feels well.  Pain is well controlled with current medications. Baby is feeding via both breast and formula. Urinary output is adequate. The patient is ambulating well. The patient is tolerating a normal diet. Flatus has been passed.    Objective:        Vitals:    24 0310   BP: 117/67   Pulse: (!) 111   Resp: 16   Temp: 98.4 °F (36.9 °C)   SpO2: 98%         Intake/Output Summary (Last 24 hours) at 2024 0853  Last data filed at 2024 0600  Gross per 24 hour   Intake 1000 ml   Output 2190 ml   Net -1190 ml     Lab Results   Component Value Date    WBC 14.3 (H) 2024    HGB 9.5 (L) 2024    HCT 30.4 (L) 2024    MCV 90.2 2024     2024       General:    alert, appears stated age, and cooperative   Lungs: clear to auscultation bilaterally   Lochia:  appropriate   Uterine    firm   Incision:  healing well, no significant drainage, no dehiscence, no significant erythema   DVT Evaluation:  No evidence of DVT seen on physical exam.     Assessment:     Status post  section. Doing well postoperatively.     Plan:     Continue current care.

## 2024-01-23 PROCEDURE — 6370000000 HC RX 637 (ALT 250 FOR IP): Performed by: OBSTETRICS & GYNECOLOGY

## 2024-01-23 PROCEDURE — 1220000000 HC SEMI PRIVATE OB R&B

## 2024-01-23 RX ORDER — HYDROCODONE BITARTRATE AND ACETAMINOPHEN 5; 325 MG/1; MG/1
1 TABLET ORAL EVERY 6 HOURS PRN
Qty: 12 TABLET | Refills: 0 | Status: SHIPPED | OUTPATIENT
Start: 2024-01-23 | End: 2024-01-26

## 2024-01-23 RX ORDER — IBUPROFEN 800 MG/1
800 TABLET ORAL 3 TIMES DAILY PRN
Qty: 30 TABLET | Refills: 0 | Status: SHIPPED | OUTPATIENT
Start: 2024-01-23

## 2024-01-23 RX ADMIN — ACETAMINOPHEN 1000 MG: 500 TABLET ORAL at 07:06

## 2024-01-23 RX ADMIN — DOCUSATE SODIUM 100 MG: 100 CAPSULE, LIQUID FILLED ORAL at 07:58

## 2024-01-23 RX ADMIN — FERROUS SULFATE TAB 325 MG (65 MG ELEMENTAL FE) 325 MG: 325 (65 FE) TAB at 07:58

## 2024-01-23 RX ADMIN — IBUPROFEN 800 MG: 800 TABLET, FILM COATED ORAL at 20:50

## 2024-01-23 RX ADMIN — Medication 1 TABLET: at 07:58

## 2024-01-23 RX ADMIN — SIMETHICONE 80 MG: 80 TABLET, CHEWABLE ORAL at 07:58

## 2024-01-23 RX ADMIN — ESCITALOPRAM OXALATE 10 MG: 10 TABLET ORAL at 07:05

## 2024-01-23 RX ADMIN — OXYCODONE 10 MG: 5 TABLET ORAL at 07:07

## 2024-01-23 RX ADMIN — ACETAMINOPHEN 1000 MG: 500 TABLET ORAL at 15:50

## 2024-01-23 RX ADMIN — SIMETHICONE 80 MG: 80 TABLET, CHEWABLE ORAL at 20:50

## 2024-01-23 RX ADMIN — SIMETHICONE 80 MG: 80 TABLET, CHEWABLE ORAL at 12:02

## 2024-01-23 RX ADMIN — DOCUSATE SODIUM 100 MG: 100 CAPSULE, LIQUID FILLED ORAL at 20:50

## 2024-01-23 RX ADMIN — IBUPROFEN 800 MG: 800 TABLET, FILM COATED ORAL at 12:01

## 2024-01-23 ASSESSMENT — PAIN DESCRIPTION - ORIENTATION
ORIENTATION: LOWER

## 2024-01-23 ASSESSMENT — PAIN DESCRIPTION - DESCRIPTORS
DESCRIPTORS: ACHING;CRAMPING;DISCOMFORT
DESCRIPTORS: ACHING
DESCRIPTORS: ACHING;DISCOMFORT
DESCRIPTORS: ACHING

## 2024-01-23 ASSESSMENT — PAIN DESCRIPTION - LOCATION
LOCATION: INCISION
LOCATION: ABDOMEN;INCISION

## 2024-01-23 ASSESSMENT — PAIN SCALES - GENERAL
PAINLEVEL_OUTOF10: 4
PAINLEVEL_OUTOF10: 3
PAINLEVEL_OUTOF10: 4
PAINLEVEL_OUTOF10: 7

## 2024-01-23 ASSESSMENT — PAIN - FUNCTIONAL ASSESSMENT
PAIN_FUNCTIONAL_ASSESSMENT: ACTIVITIES ARE NOT PREVENTED

## 2024-01-23 NOTE — OP NOTE
Subjective:     Postpartum Day 2:  Delivery    The patient feels well.  Pain is well controlled with current medications. Baby is feeding via breast. Urinary output is adequate. The patient is ambulating well. The patient is tolerating a normal diet. Flatus has been passed.    Objective:        Vitals:    24 0705   BP: 105/67   Pulse: 98   Resp: 16   Temp: 98.2 °F (36.8 °C)   SpO2: 99%       No intake or output data in the 24 hours ending 24 1313  Lab Results   Component Value Date    WBC 14.3 (H) 2024    HGB 9.5 (L) 2024    HCT 30.4 (L) 2024    MCV 90.2 2024     2024       General:    alert, appears stated age, and cooperative   Lungs: clear to auscultation bilaterally   Lochia:  appropriate   Uterine    firm   Incision:  healing well, no significant drainage, no dehiscence, no significant erythema   DVT Evaluation:  No evidence of DVT seen on physical exam.     Assessment:     Status post  section. Doing well postoperatively.     Plan:     Continue current care.

## 2024-01-23 NOTE — PROGRESS NOTES
Assessment as charted. Fundus firm @ U, scant amount of lochia, rubra- no clots. Denies any calf pain or tenderness. Instructed to call for any needs.

## 2024-01-24 VITALS
OXYGEN SATURATION: 100 % | DIASTOLIC BLOOD PRESSURE: 74 MMHG | BODY MASS INDEX: 36.8 KG/M2 | HEIGHT: 65 IN | WEIGHT: 220.9 LBS | SYSTOLIC BLOOD PRESSURE: 117 MMHG | RESPIRATION RATE: 16 BRPM | HEART RATE: 80 BPM | TEMPERATURE: 97.8 F

## 2024-01-24 PROCEDURE — 6370000000 HC RX 637 (ALT 250 FOR IP): Performed by: OBSTETRICS & GYNECOLOGY

## 2024-01-24 RX ADMIN — IBUPROFEN 800 MG: 800 TABLET, FILM COATED ORAL at 06:35

## 2024-01-24 RX ADMIN — ACETAMINOPHEN 1000 MG: 500 TABLET ORAL at 09:37

## 2024-01-24 RX ADMIN — DOCUSATE SODIUM 100 MG: 100 CAPSULE, LIQUID FILLED ORAL at 09:37

## 2024-01-24 RX ADMIN — SIMETHICONE 80 MG: 80 TABLET, CHEWABLE ORAL at 09:37

## 2024-01-24 RX ADMIN — ESCITALOPRAM OXALATE 10 MG: 10 TABLET ORAL at 06:35

## 2024-01-24 ASSESSMENT — PAIN DESCRIPTION - LOCATION
LOCATION: ABDOMEN;INCISION
LOCATION: ABDOMEN;INCISION

## 2024-01-24 ASSESSMENT — PAIN DESCRIPTION - DESCRIPTORS
DESCRIPTORS: TENDER;SORE
DESCRIPTORS: ACHING;CRAMPING;DISCOMFORT

## 2024-01-24 ASSESSMENT — PAIN SCALES - GENERAL
PAINLEVEL_OUTOF10: 4
PAINLEVEL_OUTOF10: 5

## 2024-01-24 ASSESSMENT — PAIN DESCRIPTION - ORIENTATION
ORIENTATION: LOWER
ORIENTATION: LOWER

## 2024-01-24 ASSESSMENT — PAIN - FUNCTIONAL ASSESSMENT: PAIN_FUNCTIONAL_ASSESSMENT: ACTIVITIES ARE NOT PREVENTED

## 2024-01-24 NOTE — PROGRESS NOTES
Mother and baby discharged, baby restrained in car seat while seated on mother's lap in WC in stable condition.Both accompanied by Pt's  and PCA and wheeledto private car in main lot.

## 2024-01-24 NOTE — PROGRESS NOTES
CLINICAL PHARMACY NOTE: MEDS TO BEDS    Total # of Prescriptions Filled: 2   The following medications were delivered to the patient:  Norco 5/325 mg   Motrin 800 mg       Additional Documentation:

## 2024-01-24 NOTE — LACTATION NOTE
Assisted with feeding  
Assisted with waking baby for a breastfeed in the football hold on the left breast.  Baby would not latch so a nipple shield baited with infant formula was used.  Baby latched briefly and fell asleep.  Encouraged mom to always offer breast before formula and to pace feed baby when bottle feeding.  Encouraged mom to call me for further assistance.  
Attempted feeding on the Right side. Latch not achieved    E. Cosma, CLS   
Encouraged skin to skin and frequent attempts at breast to stimulate milk production. Instructed on normal infant behavior in the first 12-24 hours and importance of stimulating the baby frequently to eat during this time. Reviewed hand expression, and encouraged to hand express drops of colostrum when baby is sleepy. Instructed that baby may also feed 8-12 times a day- cluster feeding at times- as her milk supply is being established.  Instructed on benefits of skin to skin and limiting pacifier / artificial nipple use until breastfeeding is well established.  Educated on making sure infant has an open airway while breastfeeding and skin to skin. Instructed on hunger cues and waking techniques to try. Reviewed signs of adequate I & O; allow baby to feed ad jenn and not to limit time at breast. Breastfeeding booklet provided with review of its contents. Encouraged to call with any concerns.      ALEXANDER Maher  
Mom called for breastfeeding assistance.  Worked on trying to get baby latched for over 20 minutes using a nipple shield baited with formula. Baby on and off the latch and frantic so removed shield with same frantic behavior so finished the feed with a paced bottle feeding of formula.  Encouraged parents to look for early hunger cues and to wake baby after three hours if she doesn't wake on her own to initiate a breastfeed.    
Mom is in the shower will return later.  
Mom is struggling to latch the baby, tips reviewed to help sustain latch. Encouraged frequent feeds to establish milk supply. Reviewed benefits and safety of skin to skin. Inst on adequate I/O and importance of keeping track of diapers at home. Instructed on signs of dehydration such as infant refusing to feed, decreased wet diapers and infant becoming listless and notify provider if these occur. Reviewed with mom the importance of notifying the physician if baby looks more jaundiced. Lactation office # given if follow-up needed, as well as support group information. Encouraged to call with any concerns. Support and encouragement given.   
supervision

## 2024-01-24 NOTE — PROGRESS NOTES
Subjective:     Postpartum Day 3:  Delivery    The patient feels well.  Pain is well controlled with current medications. Baby is feeding via Both breast and bottle. Urinary output is adequate. The patient is ambulating well. The patient is tolerating a normal diet. Flatus has been passed.    Objective:        Vitals:    24 2303   BP: 105/66   Pulse: 88   Resp: 16   Temp: 97.6 °F (36.4 °C)   SpO2: 100%       No intake or output data in the 24 hours ending 24 0738  Lab Results   Component Value Date    WBC 14.3 (H) 2024    HGB 9.5 (L) 2024    HCT 30.4 (L) 2024    MCV 90.2 2024     2024       General:    alert, appears stated age, and cooperative   Lungs: clear to auscultation bilaterally   Lochia:  appropriate   Uterine    firm   Incision:  healing well, no significant drainage, no dehiscence, no significant erythema   DVT Evaluation:  No evidence of DVT seen on physical exam.     Assessment:     Status post  section. Doing well postoperatively.     Plan:     Discharge to home.  Follow up in 1 week.   Follow up in 6 weeks for PP care.

## 2024-01-24 NOTE — DISCHARGE INSTRUCTIONS
Tub bath in 6 weeks  You may take a shower  Driving 2-3 weeks  Sexual activity 6 weeks  Work/School  6 weeks  Walking  As tolerated  Stairs as tolerated  Lifting no heavy liftingFollow-up with your OB doctor in 1 week if  delivery or in  6 weeks for vaginal delivery unless otherwise instructed.   Call office for an appointment.    For breastfeeding support, you can contact our lactation specialists at [phone], [phone], or [phone].    DIET  Eat a well balanced diet focusing on foods high in fiber and protein  Drink plenty of fluids especially water.  To avoid constipation you may take a mild stool softener as recommended by your doctor or midwife.    ACTIVITY  Gradually increase your activity.  Resume exercise regimen only after advised by your doctor or midwife.  Avoid lifting anything heavier than your baby or a gallon of milk for SIX weeks.   Avoid driving until your doctor or midwife has given their approval.  Rise slowly from a lying to sitting and then a standing position.  Climb stairs one at a time.  Use caution when carrying your baby up and down the stairs.  No sexual activity for 6 weeks or until advised by your doctor - Nothing in vagina: intercourse, tampons, or douching.   Be prepared to discuss family planning at your follow-up OB visit.   You may feel tired or have a lack of energy.  You may continue your prenatal vitamin to replenish nutrients post delivery.  Nap when baby naps to catch up on sleep.  May return to work or school in 6 weeks or as directed by OB.     EMOTIONS  You may feed aguero, sad, teary, & overwhelmed.  Contact your OB provider if you feel you may be showing signs of postpartum depression, or have thoughts of harming yourself or your infant.  If infant will not stop crying, contact another adult for help or place infant in their crib on their back and take a break.  NEVER shake your infant.      BLEEDING  Vaginal bleeding will decrease in amount over the  no

## 2024-01-24 NOTE — PLAN OF CARE
Problem: Pain  Goal: Verbalizes/displays adequate comfort level or baseline comfort level  Outcome: Progressing     Problem: Safety Pediatric - Fall  Goal: Free from fall injury  Outcome: Progressing     Problem: Safety - Adult  Goal: Free from fall injury  Outcome: Progressing      heart failure/hypertension

## 2024-01-26 NOTE — DISCHARGE SUMMARY
Obstetrical Discharge Form        Patient ID:  Shawna Lynn  [id number]  38 y.o.  1985    Admit date: 2024    Discharge date: 2024     Admitting Physician: Margie Goel MD    Discharge Diagnoses: 37 weeks gestation of pregnancy [Z3A.37]    Final Diagnosis:   Principal Problem:    37 weeks gestation of pregnancy  Resolved Problems:    * No resolved hospital problems. *      Discharged Condition: good      Gestational Age:37w2d    Antepartum complications: AMA,    Date of Delivery:  24    Type of Delivery:  for failure to progress    Delivered By: Oma BARRON         Baby:     Information for the patient's :  Estefany Lynn [[id number]]        Anesthesia: Epidural and Spinal    Intrapartum complications: None    Feeding method: breast    Hospital Course: Uneventful.  Patient recovered well without any issues     Discharged Condition: stable to home    Discharge Medication:      Medication List        START taking these medications      HYDROcodone-acetaminophen 5-325 MG per tablet  Commonly known as: Norco  Take 1 tablet by mouth every 6 hours as needed for Pain for up to 3 days. Intended supply: 3 days. Take lowest dose possible to manage pain Max Daily Amount: 4 tablets     ibuprofen 800 MG tablet  Commonly known as: ADVIL;MOTRIN  Take 1 tablet by mouth 3 times daily as needed for Pain            CONTINUE taking these medications      escitalopram 10 MG tablet  Commonly known as: LEXAPRO  TAKE ONE TABLET BY MOUTH DAILY     hydrocortisone 2.5 % Crea rectal cream  Commonly known as: ANUSOL-HC  Apply rectally daily for 1-2 weeks     PRENATAL 1 PO               Where to Get Your Medications        These medications were sent to [address] -  [phone] - F [phone]  [address] [place] [address]      Phone: [phone]   HYDROcodone-acetaminophen 5-325 MG per tablet  ibuprofen 800 MG tablet

## (undated) DEVICE — DOUBLE BASIN SET: Brand: MEDLINE INDUSTRIES, INC.

## (undated) DEVICE — SUTURE STRATAFIX SYMMETRIC PDS + SZ 1 L18IN ABSRB VLT L48MM SXPP1A400

## (undated) DEVICE — GLOVE ORANGE PI 7   MSG9070

## (undated) DEVICE — Device: Brand: PORTEX

## (undated) DEVICE — SUTURE VCRL + SZ 0 L36IN ABSRB VLT L36MM CT-1 1/2 CIR VCP346H

## (undated) DEVICE — VACUETTE® TUBE 6 ML Z SERUM CLOT ACTIVATOR 13X100 RED CAP-BLACK RING, NON-RIDGED: Brand: VACUETTE

## (undated) DEVICE — SYRINGE MED 10ML LUERLOCK TIP W/O SFTY DISP

## (undated) DEVICE — APPLICATOR PREP 26ML 0.7% IOD POVACRYLEX 74% ISO ALC ST

## (undated) DEVICE — SUTURE ABSORBABLE MONOFILAMENT 3-0 CT1 18 IN UD MONOCRYL + SXMP1B429

## (undated) DEVICE — 34" SINGLE PATIENT USE HOVERMATT BREATHABLE: Brand: SINGLE PATIENT USE HOVERMATT

## (undated) DEVICE — CONTAINER SPEC 64OZ POLYPR PATH SNAP LOK CAP W/ LID

## (undated) DEVICE — STRIP,CLOSURE,WOUND,MEDI-STRIP,1/2X4: Brand: MEDLINE

## (undated) DEVICE — 1LYRTR 16FR10ML 100%SILI SNAP: Brand: MEDLINE INDUSTRIES, INC.

## (undated) DEVICE — CONTAINER,SPEC,PNEUM TUBE,3OZ,STRL PATH: Brand: MEDLINE

## (undated) DEVICE — GLOVE SURG SZ 65 L12IN FNGR THK79MIL GRN LTX FREE

## (undated) DEVICE — 4-PORT MANIFOLD: Brand: NEPTUNE 2

## (undated) DEVICE — BAG SPECIMEN BIOHAZARD 6IN X 9IN

## (undated) DEVICE — CESAREAN BIRTH PACK: Brand: MEDLINE INDUSTRIES, INC.

## (undated) DEVICE — SUTURE MNCRYL STRATAFIX PS 4-0 30CM

## (undated) DEVICE — SWABSTCK, BENZOIN TINCTURE, 1/PK, STRL: Brand: APLICARE